# Patient Record
Sex: FEMALE | Race: BLACK OR AFRICAN AMERICAN | Employment: UNEMPLOYED | URBAN - METROPOLITAN AREA
[De-identification: names, ages, dates, MRNs, and addresses within clinical notes are randomized per-mention and may not be internally consistent; named-entity substitution may affect disease eponyms.]

---

## 2018-05-24 ENCOUNTER — HOSPITAL ENCOUNTER (OUTPATIENT)
Dept: PHYSICAL THERAPY | Age: 38
End: 2018-05-24
Payer: MEDICAID

## 2018-05-29 ENCOUNTER — HOSPITAL ENCOUNTER (OUTPATIENT)
Dept: PHYSICAL THERAPY | Age: 38
Discharge: HOME | End: 2018-05-29
Payer: MEDICAID

## 2018-05-29 PROCEDURE — 97110 THERAPEUTIC EXERCISES: CPT

## 2018-05-29 PROCEDURE — 97162 PT EVAL MOD COMPLEX 30 MIN: CPT
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
## 2018-06-04 ENCOUNTER — HOSPITAL ENCOUNTER (OUTPATIENT)
Dept: PHYSICAL THERAPY | Age: 38
Discharge: HOME | End: 2018-06-04
Payer: MEDICAID

## 2018-06-04 PROCEDURE — 97110 THERAPEUTIC EXERCISES: CPT

## 2018-06-04 PROCEDURE — 97162 PT EVAL MOD COMPLEX 30 MIN: CPT
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
## 2018-06-05 ENCOUNTER — HOSPITAL ENCOUNTER (OUTPATIENT)
Dept: PHYSICAL THERAPY | Age: 38
Discharge: HOME | End: 2018-06-05
Payer: MEDICAID

## 2018-06-05 PROCEDURE — 97110 THERAPEUTIC EXERCISES: CPT

## 2018-06-05 PROCEDURE — 97140 MANUAL THERAPY 1/> REGIONS: CPT

## 2018-06-05 NOTE — PROGRESS NOTES
PHYSICAL THERAPY - DAILY TREATMENT NOTE    Patient Name: Lc Vega        Date: 2018  : 1980   YES Patient  Verified  Visit #:   2  of   12  Insurance: Payor: [employer] / Plan: [address] [place] [address] / Product Type: Managed Care Medicaid /      In time: 287 Out time: 615   Total Treatment Time: 40     Medicare Time Tracking (below)   Total Timed Codes (min):  na 1:1 Treatment Time:  na     TREATMENT AREA =  Low back pain [M54.5]    SUBJECTIVE  Pain Level (on 0 to 10 scale):  7  / 10   Medication Changes/New allergies or changes in medical history, any new surgeries or procedures? NO    If yes, update Summary List   Subjective Functional Status/Changes:  []  No changes reported     Patient states her pain hasn't changed much since her IE. Patient does report compliance with her HEP, but it seems difficult to do at this time. OBJECTIVE  Modalities Rationale:     decrease pain and increase tissue extensibility to improve patient's ability to perform self care activities. min [] Estim, type/location:                                      []  att     []  unatt     []  w/US     []  w/ice    []  w/heat    min []  Mechanical Traction: type/lbs                   []  pro   []  sup   []  int   []  cont    []  before manual    []  after manual    min []  Ultrasound, settings/location:      min []  Iontophoresis w/ dexamethasone, location:                                               []  take home patch       []  in clinic   10 min []  Ice     [x]  Heat    location/position:  To lumbar spine in long sit with bolster    min []  Vasopneumatic Device, press/temp:     min []  Other:    [x] Skin assessment post-treatment (if applicable):    [x]  intact    []  redness- no adverse reaction     []redness  adverse reaction:        20 min Therapeutic Exercise:  [x]  See flow sheet   Rationale:      increase ROM and increase strength to improve the patients ability to perform walking activities. 10 min Manual Therapy: STM to B lumbar paraspinals and QL in L side lying position   Rationale:      decrease pain, increase ROM, increase tissue extensibility and decrease trigger points to improve patient's ability to perform standing activities. min Patient Education:  YES  Reviewed HEP   []  Progressed/Changed HEP based on: Other Objective/Functional Measures:    Progressed program per flowsheet in order to improve core strength and stability and to reduce pain  Patient reporting increased R LE pain with PPT on physioball this session  Tenderness noted to B lumbar paraspinals and QL R>L during MT, performed in L SL position secondary to patient pregnancy     Post Treatment Pain Level (on 0 to 10) scale:   7  / 10     ASSESSMENT  Assessment/Changes in Function:     Patient denied changes in symptoms post session. Educated patient symptoms may increase secondary initiation of new exercise program, however this will subside with consistent attendance in PT and performance of HEP. Patient acknowledged understanding. []  See Progress Note/Recertification   Patient will continue to benefit from skilled PT services to modify and progress therapeutic interventions, address functional mobility deficits, address ROM deficits, address strength deficits, analyze and address soft tissue restrictions, analyze and cue movement patterns, analyze and modify body mechanics/ergonomics, assess and modify postural abnormalities and address imbalance/dizziness to attain remaining goals.    Progress toward goals / Updated goals:    Progressing with STG#1     PLAN  [x]  Upgrade activities as tolerated YES Continue plan of care   []  Discharge due to :    []  Other:      Therapist: Yessy Cortes, PT    Date: 6/5/2018 Time: 6:28 PM     Future Appointments  Date Time Provider Joe Gonzalez   6/7/2018 6:00  [facility]

## 2018-06-07 ENCOUNTER — HOSPITAL ENCOUNTER (OUTPATIENT)
Dept: PHYSICAL THERAPY | Age: 38
Discharge: HOME | End: 2018-06-07
Payer: MEDICAID

## 2018-06-07 PROCEDURE — 97110 THERAPEUTIC EXERCISES: CPT

## 2018-06-07 PROCEDURE — 97140 MANUAL THERAPY 1/> REGIONS: CPT

## 2018-06-07 NOTE — PROGRESS NOTES
PHYSICAL THERAPY - DAILY TREATMENT NOTE    Patient Name: Arslan Ventura        Date: 2018  : 1980   yes Patient  Verified  Visit #:   2   of   12  Insurance: Payor: [employer]ICA[employer] / Plan: [address] [place] [address] / Product Type: Managed Care Medicaid /      In time: 6:00 Out time: 7:00   Total Treatment Time: 60     Medicare/BCBS Time Tracking (below)   Total Timed Codes (min):  50 1:1 Treatment Time:  50     TREATMENT AREA =  Neck pain [M54.2]  Bilateral shoulder pain [M25.511, M25.512]    SUBJECTIVE  Pain Level (on 0 to 10 scale):  7  / 10   Medication Changes/New allergies or changes in medical history, any new surgeries or procedures?    no  If yes, update Summary List   Subjective Functional Status/Changes:  []  No changes reported     Patient reports feeling pain right across the bottom of her neck and currently has a H/A behind her eyes. Denies blurred vision.        OBJECTIVE  Modalities Rationale:     decrease pain and increase tissue extensibility to improve patient's ability to regain pain-free functional cervical mobility    min [] Estim, type/location:                                      []  att     []  unatt     []  w/US     []  w/ice    []  w/heat    min []  Mechanical Traction: type/lbs                   []  pro   []  sup   []  int   []  cont    []  before manual    []  after manual    min []  Ultrasound, settings/location:      min []  Iontophoresis w/ dexamethasone, location:                                               []  take home patch       []  in clinic   10 Min []  Ice     [x]  Heat    Location/position: Seated to c/s and L/S post-session    min []  Vasopneumatic Device, press/temp:     min []  Other:    [] Skin assessment post-treatment (if applicable):    []  intact    []  redness- no adverse reaction     []redness  adverse reaction:        30 min Therapeutic Exercise:  [x]  See flow sheet   Rationale:      increase ROM and increase strength to improve the patients ability to perform functional ADLs    20 min Manual Therapy: STM/DTM to B UT/LS, c/s paraspinals; TPR to B UT   Rationale:      decrease pain, increase ROM, increase tissue extensibility and decrease trigger points to improve patient's ability to perform pain-free functional ADLs        min Patient Education:  yes  Reviewed HEP   []  Progressed/Changed HEP based on: Other Objective/Functional Measures:    Performed manual therapy seated with B LE elevated  Initiated therapeutic exercises during PT session  Presented with significant trigger points and tenderness along B UT     Post Treatment Pain Level (on 0 to 10) scale:   7  / 10     ASSESSMENT  Assessment/Changes in Function:     Demonstrated minimal improvement in c/s rotation following MT, however patient continued to present with H/A     []  See Progress Note/Recertification   Patient will continue to benefit from skilled PT services to modify and progress therapeutic interventions, address functional mobility deficits, address ROM deficits, address strength deficits, analyze and address soft tissue restrictions, analyze and cue movement patterns, analyze and modify body mechanics/ergonomics and assess and modify postural abnormalities to attain remaining goals. Progress toward goals / Updated goals:    Reports compliance with HEP (progressing towards STG #1)     PLAN  []  Upgrade activities as tolerated yes Continue plan of care   []  Discharge due to :    []  Other:      Therapist: MAYLIN Jenkins    Date: 6/7/2018 Time: 7:01 PM     No future appointments.

## 2018-06-13 ENCOUNTER — HOSPITAL ENCOUNTER (OUTPATIENT)
Dept: PHYSICAL THERAPY | Age: 38
Discharge: HOME | End: 2018-06-13
Payer: MEDICAID

## 2018-06-13 PROCEDURE — 97110 THERAPEUTIC EXERCISES: CPT

## 2018-06-13 PROCEDURE — 97140 MANUAL THERAPY 1/> REGIONS: CPT

## 2018-06-13 NOTE — PROGRESS NOTES
PHYSICAL THERAPY - DAILY TREATMENT NOTE    Patient Name: Hunter Martinez        Date: 2018  : 1980   yes Patient  Verified  Visit #:   3   of   12  Insurance: Payor: BLUE CROSS MEDICAID / Plan: [facility] [employer] / Product Type: Managed Care Medicaid /      In time: 627 Out time: 082   Total Treatment Time: 54     Medicare/BCBS Time Tracking (below)   Total Timed Codes (min):  44 1:1 Treatment Time: 44     TREATMENT AREA =  Neck pain [M54.2]  Bilateral shoulder pain [M25.511, M25.512]    SUBJECTIVE  Pain Level (on 0 to 10 scale): 7 / 10   Medication Changes/New allergies or changes in medical history, any new surgeries or procedures?    no  If yes, update Summary List   Subjective Functional Status/Changes:  []  No changes reported     \"I was sore after last time, and the massge hurt but I understand why we are doing it\"       OBJECTIVE  Modalities Rationale:     decrease pain and increase tissue extensibility to improve patient's ability to regain pain-free functional cervical mobility    min [] Estim, type/location:                                      []  att     []  unatt     []  w/US     []  w/ice    []  w/heat    min []  Mechanical Traction: type/lbs                   []  pro   []  sup   []  int   []  cont    []  before manual    []  after manual    min []  Ultrasound, settings/location:      min []  Iontophoresis w/ dexamethasone, location:                                               []  take home patch       []  in clinic   10 Min []  Ice     [x]  Heat    Location/position: Seated to c/s and L/S post-session    min []  Vasopneumatic Device, press/temp:     min []  Other:    [] Skin assessment post-treatment (if applicable):    []  intact    []  redness- no adverse reaction     []redness  adverse reaction:        24 min Therapeutic Exercise:  [x]  See flow sheet   Rationale:      increase ROM and increase strength to improve the patients ability to perform functional ADLs    20 min Manual Therapy: STM/DTM to B UT/LS, c/s paraspinals; TPR to B UT   Rationale:      decrease pain, increase ROM, increase tissue extensibility and decrease trigger points to improve patient's ability to perform pain-free functional ADLs        min Patient Education:  yes  Reviewed HEP   []  Progressed/Changed HEP based on: Other Objective/Functional Measures:    Performed manual therapy seated with B LE elevated    Educated patient on improtance of HEP for carryover from PT- pt verbalized understanding    Moderate trigger points and tightness noted in the BL UT's- slightly decreased following manual    All manual and therex 1:1 today     Post Treatment Pain Level (on 0 to 10) scale:  7 / 10     ASSESSMENT  Assessment/Changes in Function:     Reported decreased tenderness decreased to manual therapy today compared to last visit. Will continue to progress therex within current POC as patient is able. []  See Progress Note/Recertification   Patient will continue to benefit from skilled PT services to modify and progress therapeutic interventions, address functional mobility deficits, address ROM deficits, address strength deficits, analyze and address soft tissue restrictions, analyze and cue movement patterns, analyze and modify body mechanics/ergonomics and assess and modify postural abnormalities to attain remaining goals. Progress toward goals / Updated goals:    No progress towards goals at this time. Will continue to assess.       PLAN  []  Upgrade activities as tolerated yes Continue plan of care   []  Discharge due to :    []  Other:      Therapist: Colletta Plume, PT    Date: 6/13/2018 Time: 4:30PM     Future Appointments  Date Time Provider Joe Gonzalez   6/13/2018 4:30 PM Teo Barrera PT [facility]

## 2018-06-14 ENCOUNTER — HOSPITAL ENCOUNTER (OUTPATIENT)
Dept: PHYSICAL THERAPY | Age: 38
Discharge: HOME | End: 2018-06-14
Payer: MEDICAID

## 2018-06-14 PROCEDURE — 97140 MANUAL THERAPY 1/> REGIONS: CPT

## 2018-06-14 PROCEDURE — 97110 THERAPEUTIC EXERCISES: CPT

## 2018-06-14 NOTE — PROGRESS NOTES
PHYSICAL THERAPY - DAILY TREATMENT NOTE    Patient Name: Marilee Darby        Date: 2018  : 1980   YES Patient  Verified  Visit #:   3   of   12  Insurance: Payor: Stan Moya / Plan: [address] [place] [address] / Product Type: Managed Care Medicaid /      In time: 145 Out time: 257   Total Treatment Time: 48     Medicare Time Tracking (below)   Total Timed Codes (min):  na 1:1 Treatment Time:  na     TREATMENT AREA =  Low back pain [M54.5]    SUBJECTIVE  Pain Level (on 0 to 10 scale):  6-7  / 10   Medication Changes/New allergies or changes in medical history, any new surgeries or procedures? NO    If yes, update Summary List   Subjective Functional Status/Changes:  []  No changes reported     Patient reports \"I was hurting after my last appointment. \" Patient reports continued difficulty with prolonged standing and walking activities. OBJECTIVE  Modalities Rationale:     decrease inflammation, decrease pain and increase tissue extensibility to improve patient's ability to perform household activities. min [] Estim, type/location:                                      []  att     []  unatt     []  w/US     []  w/ice    []  w/heat    min []  Mechanical Traction: type/lbs                   []  pro   []  sup   []  int   []  cont    []  before manual    []  after manual    min []  Ultrasound, settings/location:      min []  Iontophoresis w/ dexamethasone, location:                                               []  take home patch       []  in clinic   10 min []  Ice     [x]  Heat    location/position:  To lumbar spine in long sit    min []  Vasopneumatic Device, press/temp:     min []  Other:    [x] Skin assessment post-treatment (if applicable):    [x]  intact    []  redness- no adverse reaction     []redness  adverse reaction:        23 min Therapeutic Exercise:  [x]  See flow sheet   Rationale:      increase ROM and increase strength to improve the patients ability to perform walking activities. 15 min Manual Therapy: STM to B thoracic and lumbar paraspinals and QL in L SL position   Rationale:      decrease pain, increase ROM, increase tissue extensibility and decrease trigger points to improve patient's ability to perform standing activities. min Patient Education:  YES  Reviewed HEP   []  Progressed/Changed HEP based on: Other Objective/Functional Measures:    Patient continues to present with increased muscle tone and tenderness to B lumbar and thoracic paraspinals. Added SB rollout in replacement of SB pelvic tilt this session, patient denied increased pain with this  Patient cued on proper TrA contraction during TrA 3 way exercise     Post Treatment Pain Level (on 0 to 10) scale:   6  / 10     ASSESSMENT  Assessment/Changes in Function:     Patient denied significant changes in symptoms post session. Patient progressing slowly in regards to pain level at this time. []  See Progress Note/Recertification   Patient will continue to benefit from skilled PT services to modify and progress therapeutic interventions, address functional mobility deficits, address ROM deficits, address strength deficits, analyze and address soft tissue restrictions, analyze and cue movement patterns, analyze and modify body mechanics/ergonomics and assess and modify postural abnormalities to attain remaining goals. Progress toward goals / Updated goals:    Slow progress with short and long term pain goals at this time.       PLAN  [x]  Upgrade activities as tolerated YES Continue plan of care   []  Discharge due to :    []  Other:      Therapist: Kristyn Weathers PT    Date: 6/14/2018 Time: 3:01 PM     Future Appointments  Date Time Provider Joe Gonzalez   6/19/2018 5:30  [facility]

## 2018-06-19 ENCOUNTER — HOSPITAL ENCOUNTER (OUTPATIENT)
Dept: PHYSICAL THERAPY | Age: 38
Discharge: HOME | End: 2018-06-19
Payer: MEDICAID

## 2018-06-19 PROCEDURE — 97110 THERAPEUTIC EXERCISES: CPT

## 2018-06-19 PROCEDURE — 97140 MANUAL THERAPY 1/> REGIONS: CPT

## 2018-06-19 NOTE — PROGRESS NOTES
PHYSICAL THERAPY - DAILY TREATMENT NOTE    Patient Name: Nelly Bertrand        Date: 2018  : 1980   yes Patient  Verified  Visit #:   4   of   12  Insurance: Payor: BLUE CROSS MEDICAID / Plan: [facility] [employer] / Product Type: Managed Care Medicaid /      In time: 5:37 Out time: 6:28   Total Treatment Time: 51     Medicare/BCBS Time Tracking (below)   Total Timed Codes (min):  41 1:1 Treatment Time:  38     TREATMENT AREA =  Neck pain [M54.2]  Bilateral shoulder pain [M25.511, M25.512]    SUBJECTIVE  Pain Level (on 0 to 10 scale):  7  / 10   Medication Changes/New allergies or changes in medical history, any new surgeries or procedures?    no  If yes, update Summary List   Subjective Functional Status/Changes:  []  No changes reported     Patient reports feeling a little better and sore after previous PT session and has been able to turn her head juts a little bit more.        OBJECTIVE  Modalities Rationale:     decrease pain and increase tissue extensibility to improve patient's ability to regain pain-free functional cervical mobility    min [] Estim, type/location:                                      []  att     []  unatt     []  w/US     []  w/ice    []  w/heat    min []  Mechanical Traction: type/lbs                   []  pro   []  sup   []  int   []  cont    []  before manual    []  after manual    min []  Ultrasound, settings/location:      min []  Iontophoresis w/ dexamethasone, location:                                               []  take home patch       []  in clinic   10 Min []  Ice     [x]  Heat    Location/position: Seated to c/s post-session    min []  Vasopneumatic Device, press/temp:     min []  Other:    [] Skin assessment post-treatment (if applicable):    []  intact    []  redness- no adverse reaction     []redness  adverse reaction:         min Therapeutic Exercise:  [x]  See flow sheet   Rationale:      increase ROM and increase strength to improve the patients ability to perform functional ADLs    15 min Manual Therapy: STM/DTM to B UT/LS, c/s paraspinals   Rationale:      decrease pain, increase ROM, increase tissue extensibility and decrease trigger points to improve patient's ability to perform pain-free functional ADLs        min Patient Education:  yes  Reviewed HEP   []  Progressed/Changed HEP based on: Other Objective/Functional Measures:    Performed manual therapy seated with B LE elevated  1:1 TE = 23'  Demonstrated decrease tone in R UT compared to previous PT session, however continues to present with significant tenderness and increased tone in L UT       Post Treatment Pain Level (on 0 to 10) scale:   7 / 10     ASSESSMENT  Assessment/Changes in Function:     Demonstrated fair tolerance with exercises; req'd cues for gentle ROM and ms activation to avoid increase pain      []  See Progress Note/Recertification   Patient will continue to benefit from skilled PT services to modify and progress therapeutic interventions, address functional mobility deficits, address ROM deficits, address strength deficits, analyze and address soft tissue restrictions, analyze and cue movement patterns, analyze and modify body mechanics/ergonomics and assess and modify postural abnormalities to attain remaining goals.    Progress toward goals / Updated goals:    Slow progress towards STG #2     PLAN  []  Upgrade activities as tolerated yes Continue plan of care   []  Discharge due to :    []  Other:      Therapist: Darlynn Alpers, LPTA    Date: 6/19/2018 Time: 6:45 PM     Future Appointments  Date Time Provider Joe Gonzalez   6/19/2018 5:30 PM Darlynn Alpers [facility]

## 2018-06-21 ENCOUNTER — HOSPITAL ENCOUNTER (OUTPATIENT)
Dept: PHYSICAL THERAPY | Age: 38
Discharge: HOME | End: 2018-06-21
Payer: MEDICAID

## 2018-06-21 PROCEDURE — 97110 THERAPEUTIC EXERCISES: CPT

## 2018-06-21 PROCEDURE — 97140 MANUAL THERAPY 1/> REGIONS: CPT

## 2018-06-21 NOTE — PROGRESS NOTES
PHYSICAL THERAPY - DAILY TREATMENT NOTE    Patient Name: Nelly Bertrand        Date: 2018  : 1980   YES Patient  Verified  Visit #:   4   of   12  Insurance: Payor: BLUE CROSS MEDICAID / Plan: [facility] HEALTHKEEPERS PLUS / Product Type: Managed Care Medicaid /      In time: 4:30 Out time: 5:26   Total Treatment Time: 54     Medicare Time Tracking (below)   Total Timed Codes (min):  na 1:1 Treatment Time:  na     TREATMENT AREA =  Low back pain [M54.5]  SUBJECTIVE  Pain Level (on 0 to 10 scale):  8-9  / 10   Medication Changes/New allergies or changes in medical history, any new surgeries or procedures? NO    If yes, update Summary List   Subjective Functional Status/Changes:  []  No changes reported     Patient reports increase low back pain, primarily across the low back up the spine into the shoulder blades. States that she felt numbness down the R LE when walking around the house today after running errands/going to MD appts        OBJECTIVE  Modalities Rationale:     decrease inflammation, decrease pain and increase tissue extensibility to improve patient's ability to perform household activities.     min [] Estim, type/location:                                      []  att     []  unatt     []  w/US     []  w/ice    []  w/heat    min []  Mechanical Traction: type/lbs                   []  pro   []  sup   []  int   []  cont    []  before manual    []  after manual    min []  Ultrasound, settings/location:      min []  Iontophoresis w/ dexamethasone, location:                                               []  take home patch       []  in clinic   10 min []  Ice     [x]  Heat    location/position: longsit to L/S post-session    min []  Vasopneumatic Device, press/temp:     min []  Other:    [x] Skin assessment post-treatment (if applicable):    [x]  intact    []  redness- no adverse reaction     []redness  adverse reaction:        29 min Therapeutic Exercise:  [x]  See flow sheet   Rationale: increase ROM and increase strength to improve the patients ability to perform walking activities. 15 min Manual Therapy: STM to B T/S, L/S, and QL in L SL position    Rationale:      decrease pain, increase ROM, increase tissue extensibility and decrease trigger points to improve patient's ability to perform standing activities. min Patient Education:  YES  Reviewed HEP   []  Progressed/Changed HEP based on: Other Objective/Functional Measures:    Performed TE semi-reclined due to patient's high subjective pain  Held on a few exercises due intolerable pain in the low back and increased RLE radicular symptoms     Post Treatment Pain Level (on 0 to 10) scale:   8 / 10     ASSESSMENT  Assessment/Changes in Function:     Able to perform remaining exercises with no increase pain or RLE radicular symptoms (see flowsheet); req'd cues for gentle ROM to decrease c/o pain or R LE    Discussed and educated patient on activity pacing in conjunction with performing HEP to attempt to decrease pain and avoid R LE radicular symptoms; patient responded with good understanding      []  See Progress Note/Recertification   Patient will continue to benefit from skilled PT services to modify and progress therapeutic interventions, address functional mobility deficits, address ROM deficits, address strength deficits, analyze and address soft tissue restrictions, analyze and cue movement patterns, analyze and modify body mechanics/ergonomics and assess and modify postural abnormalities to attain remaining goals.    Progress toward goals / Updated goals:    No significant progress towards STGs during today's PT session     PLAN  [x]  Upgrade activities as tolerated YES Continue plan of care   []  Discharge due to :    []  Other:      Therapist: MAYLIN Ortega    Date: 6/21/2018 Time: 5:30 PM     Future Appointments  Date Time Provider Joe Gonzalez   6/21/2018 4:30 PM [facility]   6/26/2018 4:30 PM [facility]   6/28/2018 4:30 PM [facility]

## 2018-06-26 ENCOUNTER — HOSPITAL ENCOUNTER (OUTPATIENT)
Dept: PHYSICAL THERAPY | Age: 38
Discharge: HOME | End: 2018-06-26
Payer: MEDICAID

## 2018-06-26 PROCEDURE — 97110 THERAPEUTIC EXERCISES: CPT

## 2018-06-26 PROCEDURE — 97140 MANUAL THERAPY 1/> REGIONS: CPT

## 2018-06-26 NOTE — PROGRESS NOTES
PHYSICAL THERAPY - DAILY TREATMENT NOTE    Patient Name: Gabi Forte        Date: 2018  : 1980   yes Patient  Verified  Visit #:   5   of   12  Insurance: Payor: BLUE CROSS MEDICAID / Plan: [facility] [employer]KEEPERS [employer] / Product Type: Managed Care Medicaid /      In time: 4:32 Out time: 5:44   Total Treatment Time: 72     Medicare/BCBS Time Tracking (below)   Total Timed Codes (min):  62 1:1 Treatment Time:  43     TREATMENT AREA =  Neck pain [M54.2]  Bilateral shoulder pain [M25.511, M25.512]    SUBJECTIVE  Pain Level (on 0 to 10 scale):  7  / 10   Medication Changes/New allergies or changes in medical history, any new surgeries or procedures?    no  If yes, update Summary List   Subjective Functional Status/Changes:  []  No changes reported     \"I just got back from the MD office for my baby, I did the neck exercises while I was waiting.  It feels better but every morning it just feels like it gets stiffened up\"       OBJECTIVE  Modalities Rationale:     decrease pain and increase tissue extensibility to improve patient's ability to regain pain-free functional cervical mobility    min [] Estim, type/location:                                      []  att     []  unatt     []  w/US     []  w/ice    []  w/heat    min []  Mechanical Traction: type/lbs                   []  pro   []  sup   []  int   []  cont    []  before manual    []  after manual    min []  Ultrasound, settings/location:      min []  Iontophoresis w/ dexamethasone, location:                                               []  take home patch       []  in clinic   10 Min []  Ice     [x]  Heat    Location/position: Seated to c/s post-session    min []  Vasopneumatic Device, press/temp:     min []  Other:    [] Skin assessment post-treatment (if applicable):    []  intact    []  redness- no adverse reaction     []redness  adverse reaction:        28/46 min Therapeutic Exercise:  [x]  See flow sheet   Rationale:      increase ROM and increase strength to improve the patients ability to perform functional ADLs    15 min Manual Therapy: STMto B UT/LS, c/s paraspinals, periscapular mms   Rationale:      decrease pain, increase ROM, increase tissue extensibility and decrease trigger points to improve patient's ability to perform pain-free functional ADLs        min Patient Education:  yes  Reviewed HEP   []  Progressed/Changed HEP based on: Other Objective/Functional Measures:    1:1 TE = 28'  Noted initial tenderness in periscapular mms during manual therapy, however patient was able to relax and continue manual therapy. Presented with significant trigger points more in L mms > R mms     Added c/s isometrics     Post Treatment Pain Level (on 0 to 10) scale:   6 / 10     ASSESSMENT  Assessment/Changes in Function:     Demonstrated improved c/s AROM following PT session. Demonstrated good tolerance with progression of exercises, noted decrease subjective pain following PT session     []  See Progress Note/Recertification   Patient will continue to benefit from skilled PT services to modify and progress therapeutic interventions, address functional mobility deficits, address ROM deficits, address strength deficits, analyze and address soft tissue restrictions, analyze and cue movement patterns, analyze and modify body mechanics/ergonomics and assess and modify postural abnormalities to attain remaining goals.    Progress toward goals / Updated goals:    Slow, gradual progress towards LTG #3     PLAN  []  Upgrade activities as tolerated yes Continue plan of care   []  Discharge due to :    []  Other:      Therapist: MAYLIN Awad    Date: 6/26/2018 Time: 6:47 PM     Future Appointments  Date Time Provider Joe Gonzalez   6/28/2018 4:30 PM [facility]

## 2018-06-28 ENCOUNTER — HOSPITAL ENCOUNTER (OUTPATIENT)
Dept: PHYSICAL THERAPY | Age: 38
End: 2018-06-28
Payer: MEDICAID

## 2018-07-02 ENCOUNTER — APPOINTMENT (OUTPATIENT)
Dept: PHYSICAL THERAPY | Age: 38
End: 2018-07-02
Payer: MEDICAID

## 2018-07-05 ENCOUNTER — APPOINTMENT (OUTPATIENT)
Dept: PHYSICAL THERAPY | Age: 38
End: 2018-07-05
Payer: MEDICAID

## 2018-07-09 ENCOUNTER — HOSPITAL ENCOUNTER (OUTPATIENT)
Dept: PHYSICAL THERAPY | Age: 38
Discharge: HOME | End: 2018-07-09
Payer: MEDICAID

## 2018-07-09 PROCEDURE — 97140 MANUAL THERAPY 1/> REGIONS: CPT

## 2018-07-09 PROCEDURE — 97110 THERAPEUTIC EXERCISES: CPT
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
## 2018-07-09 NOTE — PROGRESS NOTES
PHYSICAL THERAPY - DAILY TREATMENT NOTE    Patient Name: Cirilo Acevedo        Date: 2018  : 1980   yes Patient  Verified  Visit #:   6   of   12  Insurance: Payor: BLUE CROSS MEDICAID / Plan: [facility] [employer] / Product Type: Managed Care Medicaid /      In time: 407 Out time: 500   Total Treatment Time: 53     Medicare/BCBS Time Tracking (below)   Total Timed Codes (min):  43 1:1 Treatment Time:  30     TREATMENT AREA =  Neck pain [M54.2]  Bilateral shoulder pain [M25.511, M25.512]    SUBJECTIVE  Pain Level (on 0 to 10 scale): 5 / 10   Medication Changes/New allergies or changes in medical history, any new surgeries or procedures?    no  If yes, update Summary List   Subjective Functional Status/Changes:  []  No changes reported     SEE PN       OBJECTIVE  Modalities Rationale:     decrease pain and increase tissue extensibility to improve patient's ability to regain pain-free functional cervical mobility    min [] Estim, type/location:                                      []  att     []  unatt     []  w/US     []  w/ice    []  w/heat    min []  Mechanical Traction: type/lbs                   []  pro   []  sup   []  int   []  cont    []  before manual    []  after manual    min []  Ultrasound, settings/location:      min []  Iontophoresis w/ dexamethasone, location:                                               []  take home patch       []  in clinic   10 Min []  Ice     [x]  Heat    Location/position: Seated to c/s post-session    min []  Vasopneumatic Device, press/temp:     min []  Other:    [] Skin assessment post-treatment (if applicable):    []  intact    []  redness- no adverse reaction     []redness  adverse reaction:        28 (15) min Therapeutic Exercise:  [x]  See flow sheet   Rationale:      increase ROM and increase strength to improve the patients ability to perform functional ADLs    15 min Manual Therapy: STMto B UT/LS, c/s paraspinals, periscapular mms   Rationale: decrease pain, increase ROM, increase tissue extensibility and decrease trigger points to improve patient's ability to perform pain-free functional ADLs        min Patient Education:  yes  Reviewed HEP   []  Progressed/Changed HEP based on: Other Objective/Functional Measures:    SEE PN     Post Treatment Pain Level (on 0 to 10) scale:   5 / 10     ASSESSMENT  Assessment/Changes in Function:     SEE PN     []  See Progress Note/Recertification   Patient will continue to benefit from skilled PT services to modify and progress therapeutic interventions, address functional mobility deficits, address ROM deficits, address strength deficits, analyze and address soft tissue restrictions, analyze and cue movement patterns, analyze and modify body mechanics/ergonomics and assess and modify postural abnormalities to attain remaining goals.    Progress toward goals / Updated goals:    SEE PN     PLAN  []  Upgrade activities as tolerated yes Continue plan of care   []  Discharge due to :    []  Other:      Therapist: Meera Drake PT,    Date: 7/9/2018 Time: 600PM     Future Appointments  Date Time Provider Joe Gonzalez   7/9/2018 4:00 PM Meera Drake PT [facility]   7/12/2018 5:30 PM Adelita Head PT [facility]

## 2018-07-12 ENCOUNTER — HOSPITAL ENCOUNTER (OUTPATIENT)
Dept: PHYSICAL THERAPY | Age: 38
Discharge: HOME | End: 2018-07-12
Payer: MEDICAID

## 2018-07-12 PROCEDURE — 97110 THERAPEUTIC EXERCISES: CPT

## 2018-07-12 PROCEDURE — 97140 MANUAL THERAPY 1/> REGIONS: CPT

## 2018-07-12 NOTE — PROGRESS NOTES
PHYSICAL THERAPY - DAILY TREATMENT NOTE    Patient Name: Emerald Laguna        Date: 2018  : 1980   YES Patient  Verified  Visit #:      12  Insurance: Payor: BLUE CROSS MEDICAID / Plan: [address] [place] [address] / Product Type: Managed Care Medicaid /      In time: 233 Out time: 021   Total Treatment Time: 49     Medicare Time Tracking (below)   Total Timed Codes (min):  na 1:1 Treatment Time:  na     TREATMENT AREA =  Neck pain [M54.2]  Bilateral shoulder pain [M25.511, M25.512]    SUBJECTIVE  Pain Level (on 0 to 10 scale):  6  / 10   Medication Changes/New allergies or changes in medical history, any new surgeries or procedures? NO    If yes, update Summary List   Subjective Functional Status/Changes:  []  No changes reported     Patient reports a 30-40% improvement in symptoms since the start of therapy. Patient rates her pain at worst as a 7/10 and on average 5/10. Patient continues to be limited with bending and lifting activities. OBJECTIVE  Modalities Rationale:     decrease inflammation, decrease pain and increase tissue extensibility to improve patient's ability to perform self care activities. min [] Estim, type/location:                                      []  att     []  unatt     []  w/US     []  w/ice    []  w/heat    min []  Mechanical Traction: type/lbs                   []  pro   []  sup   []  int   []  cont    []  before manual    []  after manual    min []  Ultrasound, settings/location:      min []  Iontophoresis w/ dexamethasone, location:                                               []  take home patch       []  in clinic   10 min []  Ice     [x]  Heat    location/position:  To lumbar spine in semi-reclined with bolster    min []  Vasopneumatic Device, press/temp:     min []  Other:    [x] Skin assessment post-treatment (if applicable):    [x]  intact    []  redness- no adverse reaction     []redness  adverse reaction:        19 min Therapeutic Exercise:  [x]  See flow sheet   Rationale:      increase ROM and increase strength to improve the patients ability to perform walking activities. 20 min Manual Therapy: STM to B thoracic and lumbar paraspinals in R SL position   Rationale:      decrease pain, increase ROM, increase tissue extensibility and decrease trigger points to improve patient's ability to perform standing activities. min Patient Education:  YES  Reviewed HEP   []  Progressed/Changed HEP based on: Other Objective/Functional Measures:    See PN     Post Treatment Pain Level (on 0 to 10) scale:   6  / 10     ASSESSMENT  Assessment/Changes in Function:     See PN     []  See Progress Note/Recertification   Patient will continue to benefit from skilled PT services to modify and progress therapeutic interventions, address functional mobility deficits, address ROM deficits, address strength deficits, analyze and address soft tissue restrictions, analyze and cue movement patterns, analyze and modify body mechanics/ergonomics and assess and modify postural abnormalities to attain remaining goals.    Progress toward goals / Updated goals:    See PN     PLAN  [x]  Upgrade activities as tolerated YES Continue plan of care   []  Discharge due to :    []  Other:      Therapist: Viktoriya Lawrence PT    Date: 7/12/2018 Time: 6:47 PM     Future Appointments  Date Time Provider Joe Gonzalez   7/19/2018 4:00 PM Viktoriya Lawrence PT [facility]
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
## 2018-07-19 ENCOUNTER — HOSPITAL ENCOUNTER (OUTPATIENT)
Dept: PHYSICAL THERAPY | Age: 38
Discharge: HOME | End: 2018-07-19
Payer: MEDICAID

## 2018-07-19 PROCEDURE — 97140 MANUAL THERAPY 1/> REGIONS: CPT

## 2018-07-19 PROCEDURE — 97110 THERAPEUTIC EXERCISES: CPT

## 2018-07-19 NOTE — PROGRESS NOTES
PHYSICAL THERAPY - DAILY TREATMENT NOTE    Patient Name: Ester Goodman        Date: 2018  : 1980   YES Patient  Verified  Visit #:     Insurance: Payor: Mike Marquesjone / Plan: [address] [place] [address] / Product Type: Managed Care Medicaid /      In time: 662 Out time: 443   Total Treatment Time: 38     Medicare Time Tracking (below)   Total Timed Codes (min):  38 1:1 Treatment Time:  25     TREATMENT AREA =  Neck pain [M54.2]  Bilateral shoulder pain [M25.511, M25.512]    SUBJECTIVE  Pain Level (on 0 to 10 scale):  6  / 10   Medication Changes/New allergies or changes in medical history, any new surgeries or procedures? NO    If yes, update Summary List   Subjective Functional Status/Changes:  []  No changes reported     Patient reports she has been experiencing a headache most of today as well as slight dizziness. Patient went to her MD and her blood pressure was elevated \"a little bit\" but otherwise they weren't concerned. Patient feels achy all over today. Patient feels like her stress level is increased today due to news she received about her pregnancy. OBJECTIVE    23 min Therapeutic Exercise:  [x]  See flow sheet   Rationale:      increase ROM and increase strength to improve the patients ability to perform self care activities. 15 min Manual Therapy: STM to B UT/LS, B cervical paraspinals all in semi-reclined position   Rationale:      decrease pain, increase ROM, increase tissue extensibility and decrease trigger points to improve patient's ability to perform carrying activities. min Patient Education:  YES  Reviewed HEP   []  Progressed/Changed HEP based on:        Other Objective/Functional Measures:    1:1 TE 10'  Patient presenting with increased muscle tone throughout upper thoracic and lower cervical spine musculature  Patient required cuing on proper technique for UT/LS stretch and chin tuck exercises     Post Treatment Pain Level (on 0 to 10) scale:   8  / 10     ASSESSMENT  Assessment/Changes in Function:     Patient noted elevated pain levels post session related to manual therapy. Patient symptoms may have increased due to stress levels secondary to pregnancy. Patient educated to reduce work load at home and rest as needed to reduce symptoms. Also educated to monitor BP symptoms and contact MD as necessary. []  See Progress Note/Recertification   Patient will continue to benefit from skilled PT services to modify and progress therapeutic interventions, address functional mobility deficits, address ROM deficits, address strength deficits, analyze and address soft tissue restrictions, analyze and cue movement patterns, analyze and modify body mechanics/ergonomics and assess and modify postural abnormalities to attain remaining goals.    Progress toward goals / Updated goals:    No significant progress with LTG's this session     PLAN  [x]  Upgrade activities as tolerated YES Continue plan of care   []  Discharge due to :    []  Other:      Therapist: Evita Wyatt, PT    Date: 7/19/2018 Time: 6:45 PM     Future Appointments  Date Time Provider Joe Gonzalez   7/25/2018 6:00 PM [address], PT [facility]
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
## 2018-07-25 ENCOUNTER — HOSPITAL ENCOUNTER (OUTPATIENT)
Dept: PHYSICAL THERAPY | Age: 38
Discharge: HOME | End: 2018-07-25
Payer: MEDICAID

## 2018-07-25 PROCEDURE — 97140 MANUAL THERAPY 1/> REGIONS: CPT

## 2018-07-25 PROCEDURE — 97110 THERAPEUTIC EXERCISES: CPT
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
## 2018-08-07 ENCOUNTER — HOSPITAL ENCOUNTER (OUTPATIENT)
Dept: PHYSICAL THERAPY | Age: 38
End: 2018-08-07
Payer: MEDICAID

## 2018-08-09 ENCOUNTER — HOSPITAL ENCOUNTER (OUTPATIENT)
Dept: PHYSICAL THERAPY | Age: 38
Discharge: HOME | End: 2018-08-09
Payer: MEDICAID

## 2018-08-09 PROCEDURE — 97110 THERAPEUTIC EXERCISES: CPT

## 2018-08-09 PROCEDURE — 97140 MANUAL THERAPY 1/> REGIONS: CPT

## 2018-08-09 NOTE — PROGRESS NOTES
PHYSICAL THERAPY - DAILY TREATMENT NOTE    Patient Name: Mei Cervantes        Date: 2018  : 1980   yes Patient  Verified  Visit #:   6   of   12  Insurance: Payor: BLUE CROSS MEDICAID / Plan: [facility] [employer]US / Product Type: Managed Care Medicaid /      In time: 5:30 Out time: 6:27   Total Treatment Time: 57     Medicare/BCBS Time Tracking (below)   Total Timed Codes (min):  50 1:1 Treatment Time: 25     TREATMENT AREA =  Neck pain [M54.2]  Bilateral shoulder pain [M25.511, M25.512]    SUBJECTIVE  Pain Level (on 0 to 10 scale):  6  / 10   Medication Changes/New allergies or changes in medical history, any new surgeries or procedures?    no  If yes, update Summary List   Subjective Functional Status/Changes:  []  No changes reported     SEE D/C       OBJECTIVE  Modalities Rationale:     decrease pain and increase tissue extensibility to improve patient's ability to regain pain-free functional cervical mobility    min [] Estim, type/location:                                      []  att     []  unatt     []  w/US     []  w/ice    []  w/heat    min []  Mechanical Traction: type/lbs                   []  pro   []  sup   []  int   []  cont    []  before manual    []  after manual    min []  Ultrasound, settings/location:      min []  Iontophoresis w/ dexamethasone, location:                                               []  take home patch       []  in clinic   10 Min []  Ice     [x]  Heat    Location/position: Seated to c/s post-session    min []  Vasopneumatic Device, press/temp:     min []  Other:    [] Skin assessment post-treatment (if applicable):    []  intact    []  redness- no adverse reaction     []redness  adverse reaction:        15/30 min Therapeutic Exercise:  [x]  See flow sheet   Rationale:      increase ROM and increase strength to improve the patients ability to perform functional ADLs    10 min Manual Therapy: STM to B UT/LS, c/s paraspinals, periscapular mms in longsit Rationale:      decrease pain, increase ROM, increase tissue extensibility and decrease trigger points to improve patient's ability to perform pain-free functional ADLs        min Patient Education:  yes  Reviewed HEP   []  Progressed/Changed HEP based on: Other Objective/Functional Measures:    1:1 TE + MT = 25'    FOTO: 53/100  GROC: +5     Post Treatment Pain Level (on 0 to 10) scale:   6 / 10     ASSESSMENT  Assessment/Changes in Function:     SEE D/C     []  See Progress Note/Recertification   Patient will continue to benefit from skilled PT services to modify and progress therapeutic interventions, address functional mobility deficits, address ROM deficits, address strength deficits, analyze and address soft tissue restrictions, analyze and cue movement patterns, analyze and modify body mechanics/ergonomics and assess and modify postural abnormalities to attain remaining goals.    Progress toward goals / Updated goals:    SEE D/C       PLAN  []  Upgrade activities as tolerated no Continue plan of care   [x]  Discharge due to : Dahlia Purdy in progress towards PT goals   []  Other:      Therapist: MAYLIN Bey    Date: 8/9/2018 Time: 7:10 PM     Future Appointments  Date Time Provider Joe Gonzalez   8/9/2018 5:30 PM Pamela RICARDO [facility]
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
